# Patient Record
Sex: MALE | Race: WHITE | NOT HISPANIC OR LATINO | URBAN - METROPOLITAN AREA
[De-identification: names, ages, dates, MRNs, and addresses within clinical notes are randomized per-mention and may not be internally consistent; named-entity substitution may affect disease eponyms.]

---

## 2018-01-11 ENCOUNTER — EMERGENCY (EMERGENCY)
Facility: HOSPITAL | Age: 28
LOS: 1 days | Discharge: HOME | End: 2018-01-11
Admitting: EMERGENCY MEDICINE
Payer: OTHER MISCELLANEOUS

## 2018-01-11 VITALS
TEMPERATURE: 98 F | DIASTOLIC BLOOD PRESSURE: 78 MMHG | HEART RATE: 85 BPM | OXYGEN SATURATION: 100 % | SYSTOLIC BLOOD PRESSURE: 131 MMHG | RESPIRATION RATE: 18 BRPM

## 2018-01-11 VITALS
OXYGEN SATURATION: 100 % | TEMPERATURE: 98 F | SYSTOLIC BLOOD PRESSURE: 126 MMHG | RESPIRATION RATE: 17 BRPM | HEART RATE: 80 BPM | DIASTOLIC BLOOD PRESSURE: 75 MMHG
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
## 2018-01-11 DIAGNOSIS — S93.401A SPRAIN OF UNSPECIFIED LIGAMENT OF RIGHT ANKLE, INITIAL ENCOUNTER: ICD-10-CM
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
## 2018-01-11 PROCEDURE — 73610 X-RAY EXAM OF ANKLE: CPT | Mod: 26,RT

## 2018-01-11 PROCEDURE — 99283 EMERGENCY DEPT VISIT LOW MDM: CPT | Mod: 25

## 2018-01-11 RX ORDER — IBUPROFEN 200 MG
600 TABLET ORAL ONCE
Qty: 0 | Refills: 0 | Status: COMPLETED | OUTPATIENT
Start: 2018-01-11 | End: 2018-01-11

## 2018-01-11 RX ADMIN — Medication 600 MILLIGRAM(S): at 12:56

## 2018-01-11 NOTE — ED PROVIDER NOTE - OBJECTIVE STATEMENT
pt. work for [employer] twisted rt ankle while going downstairs. c/o mild discomfort when walking, + swelling. no other injury, no fall, no head injury, denies taking anything for pain.

## 2018-01-11 NOTE — ED PROVIDER NOTE - MUSCULOSKELETAL, MLM
rt ankle : mild swelling, + ecchymosis, no deformity, N/v intact,  no erythema,  Spine appears normal, range of motion is not limited, no muscle or joint tenderness

## 2018-01-11 NOTE — ED PROVIDER NOTE - DIAGNOSTIC INTERPRETATION
Interpreted by ED physician  _jerald x-ray, _3 views  No fracture, no dislocation (joint spaces grossly normal), no Foreign Body noted, soft tissue normal

## 2018-12-03 ENCOUNTER — EMERGENCY (EMERGENCY)
Facility: HOSPITAL | Age: 28
LOS: 1 days | Discharge: HOME | End: 2018-12-03
Admitting: EMERGENCY MEDICINE
Payer: OTHER MISCELLANEOUS

## 2018-12-03 VITALS
SYSTOLIC BLOOD PRESSURE: 142 MMHG | DIASTOLIC BLOOD PRESSURE: 78 MMHG | TEMPERATURE: 98 F | WEIGHT: 149.91 LBS | RESPIRATION RATE: 18 BRPM | HEART RATE: 92 BPM | OXYGEN SATURATION: 98 %

## 2018-12-03 PROCEDURE — 99283 EMERGENCY DEPT VISIT LOW MDM: CPT

## 2018-12-03 RX ORDER — IBUPROFEN 200 MG
800 TABLET ORAL ONCE
Qty: 0 | Refills: 0 | Status: COMPLETED | OUTPATIENT
Start: 2018-12-03 | End: 2018-12-03

## 2018-12-03 RX ADMIN — Medication 800 MILLIGRAM(S): at 11:17

## 2018-12-03 NOTE — ED PROVIDER NOTE - MEDICAL DECISION MAKING DETAILS
Clinical presentation c/w achilles tendon rupture of LLE. Pt was ACE wrapped, CAM boot applied and crutches were given. RICE, NSAIDs and Ortho F/U this week.

## 2018-12-03 NOTE — ED ADULT NURSE NOTE - NSIMPLEMENTINTERV_GEN_ALL_ED
Implemented All Universal Safety Interventions:  [place] to call system. Call bell, personal items and telephone within reach. Instruct patient to call for assistance. Room bathroom lighting operational. Non-slip footwear when patient is off stretcher. Physically safe environment: no spills, clutter or unnecessary equipment. Stretcher in lowest position, wheels locked, appropriate side rails in place.

## 2018-12-03 NOTE — ED PROVIDER NOTE - CARE PROVIDER_API CALL
Mikel Reza), Orthopaedic Surgery Surgery  [address] [place] [address]  Phone: [phone]  Fax: [phone]

## 2018-12-03 NOTE — ED PROVIDER NOTE - MUSCULOSKELETAL MINIMAL EXAM
+ tenderness with mild swelling to left achilles with difficulty plantar flexing. Positive Welch test. No obvious defomirty or palpable crepitus. Distal NV status intact.

## 2018-12-03 NOTE — ED PROVIDER NOTE - OBJECTIVE STATEMENT
29 y/o M with no known significant PMH presents to the ED c/o injury to left achilles tendon while at work this morning. He states he was stepping down from a ladder and missed a step in which he slipped and fell approximately 3 feet. He states he landed directly onto his left foot and felt a sudden " pop" to his achilles with associated pain. He states the pain is worse with bearing weight, especially on his toes. He has not taken any medication for the pain. No other associated injuries. No paresthesias, numbness or weakness. No open wounds.

## 2018-12-03 NOTE — ED ADULT NURSE NOTE - MODE OF DISCHARGE
Ambulatory with cane/crutches/walker/educated patient on proper crutch use, retuned appropriate demonstration
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
## 2018-12-07 DIAGNOSIS — S86.012A STRAIN OF LEFT ACHILLES TENDON, INITIAL ENCOUNTER: ICD-10-CM
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
## 2018-12-07 DIAGNOSIS — Z88.0 ALLERGY STATUS TO PENICILLIN: ICD-10-CM

## 2018-12-28 ENCOUNTER — INBOUND DOCUMENT (OUTPATIENT)
Age: 28
End: 2018-12-28

## 2018-12-28 PROBLEM — Z00.00 ENCOUNTER FOR PREVENTIVE HEALTH EXAMINATION: Status: ACTIVE | Noted: 2018-12-28

## 2023-05-30 ENCOUNTER — EMERGENCY (EMERGENCY)
Facility: HOSPITAL | Age: 33
LOS: 1 days | Discharge: HOME | End: 2023-05-30
Attending: EMERGENCY MEDICINE | Admitting: EMERGENCY MEDICINE
Payer: OTHER MISCELLANEOUS

## 2023-05-30 VITALS
HEART RATE: 87 BPM | DIASTOLIC BLOOD PRESSURE: 85 MMHG | RESPIRATION RATE: 20 BRPM | OXYGEN SATURATION: 95 % | SYSTOLIC BLOOD PRESSURE: 121 MMHG | TEMPERATURE: 98 F

## 2023-05-30 VITALS
HEIGHT: 67 IN | DIASTOLIC BLOOD PRESSURE: 80 MMHG | HEART RATE: 106 BPM | TEMPERATURE: 98 F | RESPIRATION RATE: 18 BRPM | WEIGHT: 145.06 LBS | OXYGEN SATURATION: 95 % | SYSTOLIC BLOOD PRESSURE: 126 MMHG
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
## 2023-05-30 DIAGNOSIS — Z88.0 ALLERGY STATUS TO PENICILLIN: ICD-10-CM
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
## 2023-05-30 DIAGNOSIS — S16.1XXA STRAIN OF MUSCLE, FASCIA AND TENDON AT NECK LEVEL, INITIAL ENCOUNTER: ICD-10-CM

## 2023-05-30 DIAGNOSIS — M54.50 LOW BACK PAIN, UNSPECIFIED: ICD-10-CM
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
## 2023-05-30 PROCEDURE — 99053 MED SERV 10PM-8AM 24 HR FAC: CPT

## 2023-05-30 PROCEDURE — 99284 EMERGENCY DEPT VISIT MOD MDM: CPT

## 2023-05-30 RX ORDER — IBUPROFEN 200 MG
600 TABLET ORAL ONCE
Refills: 0 | Status: COMPLETED | OUTPATIENT
Start: 2023-05-30 | End: 2023-05-30

## 2023-05-30 RX ORDER — ACETAMINOPHEN 500 MG
650 TABLET ORAL ONCE
Refills: 0 | Status: COMPLETED | OUTPATIENT
Start: 2023-05-30 | End: 2023-05-30

## 2023-05-30 RX ORDER — LIDOCAINE 4 G/100G
1 CREAM TOPICAL ONCE
Refills: 0 | Status: COMPLETED | OUTPATIENT
Start: 2023-05-30 | End: 2023-05-30

## 2023-05-30 RX ADMIN — Medication 600 MILLIGRAM(S): at 02:19

## 2023-05-30 RX ADMIN — LIDOCAINE 1 PATCH: 4 CREAM TOPICAL at 02:20

## 2023-05-30 RX ADMIN — Medication 650 MILLIGRAM(S): at 02:19
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
## 2023-05-30 NOTE — ED PROVIDER NOTE - NSFOLLOWUPINSTRUCTIONS_ED_ALL_ED_FT
Please read all handouts provided to you from the emergency department.  Seek immediate medical attention for any new/worsening signs or symptoms.  You may take ibuprofen 600 mg every 6 hours and/or acetaminophen 650 mg every 4-6 hours as needed for pain.       Cervical Sprain    A cervical sprain is when the tissues (ligaments) that hold the neck bones in place stretch or tear.     HOME CARE  Put ice on the injured area.  Put ice in a plastic bag.  Place a towel between your skin and the bag.  Leave the ice on for 15–20 minutes, 3–4 times a day.   You may have been given a collar to wear. This collar keeps your neck from moving while you heal.  Do not take the collar off unless told by your doctor.  If you have long hair, keep it outside of the collar.   Ask your doctor before changing the position of your collar. You may need to change its position over time to make it more comfortable.   If you are allowed to take off the collar for cleaning or bathing, follow your doctor's instructions on how to do it safely.  Keep your collar clean by wiping it with mild soap and water. Dry it completely. If the collar has removable pads, remove them every 1–2 days to hand wash them with soap and water. Allow them to air dry. They should be dry before you wear them in the collar.  Do not drive while wearing the collar.  Only take medicine as told by your doctor.  Keep all doctor visits as told.  Keep all physical therapy visits as told.  Adjust your work station so that you have good posture while you work.  Avoid positions and activities that make your problems worse.  Warm up and stretch before being active.     GET HELP IF:  Your pain is not controlled with medicine.  You cannot take less pain medicine over time as planned.  Your activity level does not improve as expected.    GET HELP RIGHT AWAY IF:  You are bleeding.  Your stomach is upset.  You have an allergic reaction to your medicine.  You develop new problems that you cannot explain.  You lose feeling (become numb) or you cannot move any part of your body (paralysis).  You have tingling or weakness in any part of your body.  Your symptoms get worse. Symptoms include:  Pain, soreness, stiffness, puffiness (swelling), or a burning feeling in your neck.   Pain when your neck is touched.  Shoulder or upper back pain.   Limited ability to move your neck.   Headache.   Dizziness.   Your hands or arms feel week, lose feeling, or tingle.   Muscle spasms.   Difficulty swallowing or chewing.     MAKE SURE YOU:  Understand these instructions.  Will watch your condition.  Will get help right away if you are not doing well or get worse.

## 2023-05-30 NOTE — ED PROVIDER NOTE - PATIENT PORTAL LINK FT
You can access the FollowMyHealth Patient Portal offered by [facility] by registering at the following website: [email or web address]. By joining [employer]’s FollowMyHealth portal, you will also be able to view your health information using other applications (apps) compatible with our system.
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
## 2023-05-30 NOTE — ED PROVIDER NOTE - PHYSICAL EXAMINATION
Patient A&Ox3, well-appearing, and in no acute distress. Head NCAT. EOM intact and PERRL. Oropharynx with MMM. Heart rate regular, with no murmurs/gallops/clicks/rubs. Breath sounds clear to auscultation bilaterally. Abdomen NTND, soft, with normal bowel sounds. Skin warm and dry. 5/5 strength in all 4 extremities with sensation intact to light touch. CN II-XII grossly intact.

## 2023-11-28 NOTE — ED ADULT NURSE NOTE - NSFALLUNIVINTERV_ED_ALL_ED
Conjuntivae and eyelids appear normal,  Sclerae : White without injection Bed/Stretcher in lowest position, wheels locked, appropriate side rails in place/Call bell, personal items and telephone in reach/Instruct patient to call for assistance before getting out of bed/chair/stretcher/Non-slip footwear applied when patient is off stretcher/[place] to call system/Physically safe environment - no spills, clutter or unnecessary equipment/Purposeful proactive rounding/Room/bathroom lighting operational, light cord in reach

## 2024-07-16 NOTE — ED ADULT NURSE NOTE - OBJECTIVE STATEMENT
Chronic HBV diagnosed 2016. Was on viread before but now maintained on 25mg vemlidy. Rx well tolerated. No adverse effects reported. No known cirrhosis Last labs with LFTs wnl.. No significant alcohol use history. In april was intiated on ozempic for weight loss and poor glycemic control. Has lost aprox 60lb weight loss. Has modified diet with elimination of soda and sugar. No prior  vaccination for HAV HCV Ab negative. No family history of liver disease. No prior liver biopsy. No fevers or chills. No night sweats. No weight loss. No nausea or vomiting. No travel,sick contacts or new medications. No family history IBD,CRC.
s/p mechanical fall c/o pain to inner right ankle. no deformity noted. xray done. [employer] maintained

## 2024-09-11 NOTE — ED PROVIDER NOTE - CONSTITUTIONAL NEGATIVE STATEMENT, MLM
no fever and no chills.
with increased time, O-log 5/30/75% of the time/able to follow single-step instructions
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
## 2025-07-23 NOTE — ED ADULT TRIAGE NOTE - CHIEF COMPLAINT QUOTE
[employer] employee complains of right wrist pain, states fell 2 steps. Detail Level: Zone Photo Preface (Leave Blank If You Do Not Want): Photographs were obtained today [employer] employee complains of right ankle pain, states fell 2 steps.